# Patient Record
Sex: FEMALE | Race: WHITE | Employment: OTHER | URBAN - METROPOLITAN AREA
[De-identification: names, ages, dates, MRNs, and addresses within clinical notes are randomized per-mention and may not be internally consistent; named-entity substitution may affect disease eponyms.]

---

## 2018-07-16 ENCOUNTER — THERAPY VISIT (OUTPATIENT)
Dept: PHYSICAL THERAPY | Facility: CLINIC | Age: 83
End: 2018-07-16
Payer: COMMERCIAL

## 2018-07-16 DIAGNOSIS — M99.05 SOMATIC DYSFUNCTION OF PELVIC REGION: Primary | ICD-10-CM

## 2018-07-16 DIAGNOSIS — N39.46 MIXED INCONTINENCE: ICD-10-CM

## 2018-07-16 PROCEDURE — 97112 NEUROMUSCULAR REEDUCATION: CPT | Mod: GP | Performed by: PHYSICAL THERAPIST

## 2018-07-16 PROCEDURE — 97161 PT EVAL LOW COMPLEX 20 MIN: CPT | Mod: GP | Performed by: PHYSICAL THERAPIST

## 2018-07-16 PROCEDURE — 97530 THERAPEUTIC ACTIVITIES: CPT | Mod: GP | Performed by: PHYSICAL THERAPIST

## 2018-07-16 NOTE — MR AVS SNAPSHOT
After Visit Summary   7/16/2018    Sofía Zuniga    MRN: [id number]           Patient Information     Date Of Birth          1935        Visit Information        Provider Department      7/16/2018 12:00 PM Connie Estrada PT [facility] Physical Therapy        Today's Diagnoses     Somatic dysfunction of pelvic region    -  1    Mixed incontinence           Follow-ups after your visit        Your next 10 appointments already scheduled     Jul 23, 2018 11:20 AM CDT   JAYLA For Women Only with Connie Estrada PT   [facility] Physical Therapy ([facility]  )    [address] [place] [address]   [phone]            Aug 06, 2018  1:20 PM CDT   JAYLA For Women Only with Connie Estrada PT   [facility] Physical Therapy ([facility]  )    [address] [place] [address]   [phone]            Aug 13, 2018  1:20 PM CDT   JAYLA For Women Only with Connie Estrada PT   [facility] Physical Therapy ([facility]  )    [address] [place] [address]   [phone]            Aug 20, 2018 12:40 PM CDT   JAYLA For Women Only with Connie Estrada, PT   [facility] ([facility]  )    [address] [place] [address]   [phone]              Who to contact     If you have questions or need follow up information about today's clinic visit or your schedule please contact [place] [facility] PHYSICAL THERAPY directly at [phone].  Normal or non-critical lab and imaging results will be communicated to you by MyChart, letter or phone within 4 business days after the clinic has received the results. If you do not hear from us within 7 days, please contact the clinic through MyChart or phone. If you have a critical or abnormal lab result, we  will notify you by phone as soon as possible.  Submit refill requests through [employer] or call your pharmacy and they will forward the refill request to us. Please allow 3 business days for your refill to be completed.          Additional Information About Your Visit        Care EveryWhere ID     This is your Care EveryWhere ID. This could be used by other organizations to access your [facility] medical records  [id number]         Blood Pressure from Last 3 Encounters:   No data found for BP    Weight from Last 3 Encounters:   No data found for Wt              We Performed the Following     HC PT EVAL, LOW COMPLEXITY     JAYLA INITIAL EVAL REPORT     NEUROMUSCULAR RE-EDUCATION     THERAPEUTIC ACTIVITIES        Primary Care Provider Office Phone # Fax #    Marlin Liu [phone]       [address] [address]        Equal Access to Services     [facility] : Hadii aad ku hadasho Soomaali, waaxda luqadaha, qaybta kaalmada adeegyada, waxay fuentesin hayrey medrano . So [facility] [phone].    ATENCIÓN: Si habla español, tiene a pabon disposición servicios gratuitos de asistencia lingüística. Neena al [phone].    We comply with applicable federal civil rights laws and [place] laws. We do not discriminate on the basis of race, color, national origin, age, disability, sex, sexual orientation, or gender identity.            Thank you!     Thank you for choosing [facility] PHYSICAL THERAP[facility]  for your care. Our goal is always to provide you with excellent care. Hearing back from our patients is one way we can continue to improve our services. Please take a few minutes to complete the written survey that you may receive in the mail after your visit with us. Thank you!             Your Updated Medication List - Protect others around you: Learn how to safely use, store and throw away your medicines at  www.disposemymeds.org.      Notice  As of 7/16/2018 11:59 PM    You have not been prescribed any medications.

## 2018-07-16 NOTE — LETTER
[facility]  [address] [address]  [phone]    2018    Re: Sofía Zuniga   :   1935  MRN:  [id number]   REFERRING PHYSICIAN:   Marlin Liu    [facility]    Date of Initial Evaluation:  18  Visits:  Rxs Used: 1  Reason for Referral:     Somatic dysfunction of pelvic region  Mixed incontinence    EVALUATION SUMMARY    Physical Therapy Initial Examination/Evaluation 2018   Sofía Zuniga is a 82 year old female referred to physical therapy by Dr. Marlin Liu for treatment of Urinary Incontinence with Precautions/Restrictions/MD instruction: none noted   Therapist Assessment:   Clinical Impression: Pt  presents to [facility]  with primary complaint of stress incontinence and urgency at night.  Per clinical examination, pt with decreased tone in pelvic floor and compensating with surrounding muscles to complete pelvic floor contraction.   Pt will benefit from skilled physical therapy for strength and stabilization program. Pt will also benefit from education on optimal daily fluid intake and normal voiding patterns to reduce straining with urination.   Subjective:  Pt reports that with sneezing, coughing, and laughing she will have urinary leakage. Pt has been waking up at night and has difficulty getting to the bathroom in time. Pt unsure of how long incontinence has been going on. Pt reports there are times she does nt feel she empties bladder.   DOI/onset: MD pedraza 2018   Mechanism of injury: none    DOS NA   Related PMH: NA Previous treatment: none   Imaging: NA   Chief Complaint: Stress Incontinence and urinary urgency at night   Pain: rest 0  /10, activity 0 /10  Described as: NA Alleviated by: NA Exacerbated by: NA     Re: Sofía Zuniga   :   1935    Progression of symptoms since initial onset: Worsening  Time of day when  pain is worse: Activity or night time   Sleeping: pt wakes 2-3x/night to urinate   Social history: Granddaugther is an MD; lives alone; had 2 children; daughter passed 3 years ago; son is here in the [place]  Occupation: Retired professor Job duties: daily housework   Current HEP/exercise regimen: Minimal; goes to pool 2x/wk   Patient's goals are Decrease incontinence be able to sleep better at night   Other pertinent PMH: hx of CA (somethin on nose), depression, htn, incontinence, OA, overweight, sleep disorder, thyroid problems, headaches, difficulty using hands General health as reported by patient: Poor    Return to MD: prn    Urination:  Do you leak on the way to the bathroom or with a strong urge to void? Yes    Do you leak with cough,sneeze, jumping, running?Yes   Any other activities that cause leaking? No   Do you have triggers that make you feel you can't wait to go to the bathroom? No     Type of pad and number used per day? Has begun to wear pad; 1 thin pad  When you leak what is the amount? Small    How long can you delay the need to urinate? Pt tries to go right away.   How many times do you get up to urinate at night? 2-3x/ night to urinate    Can you stop the flow of urine when on the toilet? Unsure  Is the volume of urine passed usually: Variable. (8sec rule=  250ml with average bladder storing  400-600ml)    Do you strain to pass urine? Yes  Do you have a slow or hesitant urinary stream? Unsure  Do you have difficulty initiating the urine stream? Yes  Is urination painful?  Only with infection     How many bladder infections have you had in last 12 months? Unsure    Fluid intake(one glass is 8oz or one cup) 2 bottles/day (cultural issue), 1x/day cup of coffee caffinated glasses/day  Very rare alcohol glasses/day.    Bowel habits:  Frequency of bowel movements? 1-2 times a day  Consistancy of stool? soft formed  Do you ignore the urge to defecate? No  Do you strain to pass stool? No      Re:  Sofía Zuniga   :   1935      Pelvic Pain:  Do you have any pelvic pain with intercourse, exams, use of tampons? No  Is initial penetration during intercourse painful? NA  Is deeper penetration painful? NA    Given birth? Yes Any complications? No  # of vaginal delieveries? 2   # of C-sections? 0  # of episiotomies? 0.  Are you sexually active?No  Have you ever been worried for your physical safety? No  Any abdominal or pelvic surgeries? None noted  Are you having any regular exercise? Goes to the pool 2x/wk   Have you practiced the PF(kegel) exercises for 4 or more weeks? No    MUSCLE PERFORMANCE    Baseline PF tone:hypo  PF Tone with cough: hypo  Valsalva: not tested  PF Response quality: sluggish moderate  PF Power: Center: 2   Endurance: Maximum contraction in seconds: 4 seconds   # of endurance contractions before fatigue: NT  Quick contraction repetitions prior to fatigue: 8-10  Specificity/accessory muscles: Abdominals, glutes, adductors      2018 Left Right   Hip Flexion 4/5 4/5   Hip Abduction 3/5 3/5   Hip Extension  4-/5 4/5     PALPATION: hypotonic pelvic floor     Therapeutic Activity (25 min): Today's session consisted of education regarding pelvic floor muscle anatomy, normal bladder function, urge suppression techniques and/or relaxation techniques as indicated, and instruction in how to complete a bladder diary for assessment next visit.  Pt was instructed in the pathoanatomy of the pelvic floor utilizing pelvic model.  We discussed what pelvic floor physical therapy is, components of exam, and typical patient progression.  Pt was told that she was in control of exam progression, and if at any time was uncomfortable and wished to discontinue we could. Discussed proper voiding techniques and importance of limiting strain to pelvic floor. Discussed manual pressure vs sit to stand transfers to assist with emptying bladder.           Re: Sofía Zuniga   :   1935      [facility] (20  min)  Biofeedback unit used to provide visualization of PF activation in multiple body positions including supine, sidelying, and sitting.  Education provided on building strength for power and endurance in pt's PF due to pt's low resting tone and symptoms. Pt working on 2s contractions, 4s rest,  10 reps 2-3 x/day, with goal of 60 contractions/day.  Initiated education on proper body mechanics for lifting and the importance of core strength.  Pt provided with HEP.    Assessment/Plan:    Patient is a 82 year old female with pelvic complaints.    Patient has the following significant findings with corresponding treatment plan.                Diagnosis 1:  Urinary Incontinence  Decreased strength - therapeutic exercise, therapeutic activities and home program  Impaired muscle performance - biofeedback, electric stimulation, neuro re-education and home program  Decreased function - therapeutic activities and home program    Therapy Evaluation Codes:   1) History comprised of:   Personal factors that impact the plan of care:      Age, Living environment, Past/current experiences and Time since onset of  symptoms.    Comorbidity factors that impact the plan of care are:      Cancer, Depression, High blood pressure, Migraines/headaches, Osteoarthritis,  Overweight, Sleep disorder/apnea and Weakness.     Medications impacting care: Anti-depressant, High blood pressure and Thyroid.  2) Examination of Body Systems comprised of:   Body structures and functions that impact the plan of care:      Pelvis.   Activity limitations that impact the plan of care are:      Stress incontinence, Urgency, Urge incontinence and Urinary incontinence.  3) Clinical presentation characteristics are:   Stable/Uncomplicated.  4) Decision-Making    Low complexity using standardized patient assessment instrument and/or  measureable assessment of functional outcome.  Cumulative Therapy Evaluation is: Low complexity.    Previous and current  functional limitations:  (See Goal Flow Sheet for this information)    Short term and Long term goals: (See Goal Flow Sheet for this information)     Communication ability:  Patient appears to be able to clearly communicate and understand verbal and written communication and follow directions correctly.  Treatment Explanation - The following has been discussed with the patient:   RX ordered/plan of care  Anticipated outcomes  Possible risks and side effects  This patient would benefit from PT intervention to resume normal activities.   Rehab potential is good.  Re: Sofía Zuniga   :   1935      Frequency:  1 X week, once daily  Duration:  for 8 weeks  Discharge Plan:  Achieve all LTG.  Independent in home treatment program.  Reach maximal therapeutic benefit.    Please refer to the daily flowsheet for treatment today, total treatment time and time spent performing 1:1 timed codes.     Thank you for your referral.    INQUIRIES  Therapist: Connie Estrada DPT   [facility] PHYSICAL THERAPY  [address] [place] [address]  Phone: [phone]  Fax: [phone]

## 2018-07-19 PROBLEM — M99.05 SOMATIC DYSFUNCTION OF PELVIC REGION: Status: ACTIVE | Noted: 2018-07-19

## 2018-07-19 PROBLEM — N39.46 MIXED INCONTINENCE: Status: ACTIVE | Noted: 2018-07-19

## 2018-07-23 ENCOUNTER — THERAPY VISIT (OUTPATIENT)
Dept: PHYSICAL THERAPY | Facility: CLINIC | Age: 83
End: 2018-07-23
Payer: COMMERCIAL

## 2018-07-23 DIAGNOSIS — N39.46 MIXED INCONTINENCE: ICD-10-CM

## 2018-07-23 DIAGNOSIS — M99.05 SOMATIC DYSFUNCTION OF PELVIC REGION: Primary | ICD-10-CM

## 2018-07-23 PROCEDURE — 97110 THERAPEUTIC EXERCISES: CPT | Mod: GP | Performed by: PHYSICAL THERAPIST

## 2018-07-23 PROCEDURE — 97112 NEUROMUSCULAR REEDUCATION: CPT | Mod: GP | Performed by: PHYSICAL THERAPIST

## 2018-07-23 NOTE — MR AVS SNAPSHOT
After Visit Summary   7/23/2018    Sofía Zuniga    MRN: [id number]           Patient Information     Date Of Birth          1935        Visit Information        Provider Department      7/23/2018 11:20 AM Connie Estrada PT [facility] Physical Therapy        Today's Diagnoses     Somatic dysfunction of pelvic region    -  1    Mixed incontinence           Follow-ups after your visit        Your next 10 appointments already scheduled     Aug 06, 2018  1:20 PM CDT   JAYLA For Women Only with Connie Estrada PT   [facility] Physical Therapy ([facility]  )    [address] [place] [address]   [phone]            Aug 13, 2018  1:20 PM CDT   JAYLA For Women Only with Connie Estrada PT   [facility] Physical Therapy ([facility]  )    [address] [place] [address]   [phone]            Aug 20, 2018 12:40 PM CDT   JAYLA For Women Only with Connie Estrada PT   [facility] Physical Therapy ([facility]  )    [address] [place] [address]   [phone]            Aug 27, 2018 11:20 AM CDT   JAYLA For Women Only with Connie Estrada, PT   [facility] Physical Therapy ([facility]  )    [address] [place] [address]   [phone]            Sep 05, 2018 11:30 AM CDT   JAYLA For Women Only with Connie Estrada PT   [facility] Physical Therapy ([facility]  )    [address] [place] [address]   [phone]              Who to contact     If you have questions or need follow up information about today's clinic visit or your schedule please contact [place] [facility] PHYSICAL THERAPY directly at [phone].  Normal or non-critical lab and imaging results will be communicated to you by  MyChart, letter or phone within 4 business days after the clinic has received the results. If you do not hear from us within 7 days, please contact the clinic through MyChart or phone. If you have a critical or abnormal lab result, we will notify you by phone as soon as possible.  Submit refill requests through [employer] or call your pharmacy and they will forward the refill request to us. Please allow 3 business days for your refill to be completed.          Additional Information About Your Visit        Care EveryWhere ID     This is your Care EveryWhere ID. This could be used by other organizations to access your [place] medical records  [id number]         Blood Pressure from Last 3 Encounters:   No data found for BP    Weight from Last 3 Encounters:   No data found for Wt              We Performed the Following     NEUROMUSCULAR RE-EDUCATION     THERAPEUTIC EXERCISES        Primary Care Provider Office Phone # Fax #    Marlin Liu [phone]       [place] [address] [address] [address]        Equal Access to Services     [facility]: Hadii aad ku hadasho Soomaali, waaxda luqadaha, qaybta kaalmada adeegyada, waxay idiin hayaan manuel medrano . So [facility] [phone].    ATENCIÓN: Si olivierla español, tiene a pabon disposición servicios gratuitos de asistencia lingüística. [facility] [phone].    We comply with applicable federal civil rights laws and [place] laws. We do not discriminate on the basis of race, color, national origin, age, disability, sex, sexual orientation, or gender identity.            Thank you!     Thank you for choosing [facility] PHYSICAL THERAP[facility]  for your care. Our goal is always to provide you with excellent care. Hearing back from our patients is one way we can continue to improve our services. Please take a few minutes to complete the written survey that you may receive in the mail after your visit  with us. Thank you!             Your Updated Medication List - Protect others around you: Learn how to safely use, store and throw away your medicines at www.disposemymeds.org.      Notice  As of 7/23/2018 12:11 PM    You have not been prescribed any medications.

## 2019-07-17 PROBLEM — N39.46 MIXED INCONTINENCE: Status: RESOLVED | Noted: 2018-07-19 | Resolved: 2018-07-23

## 2019-07-17 NOTE — PROGRESS NOTES
Discharge Note    Progress reporting period is from initial evaluation date  Jul 16 to Jul 23, 2018.      Sofía failed to follow up and current status is unknown.  Please see information below for last relevant information on current status.  Patient seen for 2 visits.    SUBJECTIVE  Subjective changes noted by patient:  Pt worked on home exercises and tried different positions, noticing other muscles were trying to compensate. She was able to stop urine flow ~40% of the time.   .  Current pain level is  .     Previous pain level was  0/10(Does have increased pain in knee).   Changes in function:  Yes (See Goal flowsheet attached for changes in current functional level)  Adverse reaction to treatment or activity: None    OBJECTIVE  Changes noted in objective findings: Biofeedback for visualization; progressed PF contractions to sitting     ASSESSMENT/PLAN  Diagnosis: Urinary incontinence   Updated problem list and treatment plan:   Decreased function - HEP  Decreased strength - HEP  Impaired muscle performance - HEP  STG/LTGs have been met or progress has been made towards goals:  Yes, please see goal flowsheet for most current information  Assessment of Progress: current status is unknown.    Last current status: Pt is progressing as expected   Self Management Plans:  HEP  I have re-evaluated this patient and find that the nature, scope, duration and intensity of the therapy is appropriate for the medical condition of the patient.  Sofía continues to require the following intervention to meet STG and LTG's:  HEP.    Recommendations:  Discharge with current home program.  Patient to follow up with MD as needed.    Please refer to the daily flowsheet for treatment today, total treatment time and time spent performing 1:1 timed codes.

## 2025-03-06 NOTE — PROGRESS NOTES
[facility]  System  Physical Exam  General   ROS        Physical Therapy Initial Examination/Evaluation July 16, 2018   Sofía Zuniga is a 82 year old female referred to physical therapy by Dr. Marlin Liu for treatment of Urinary Incontinence with Precautions/Restrictions/MD instruction: none noted   Therapist Assessment:   Clinical Impression: Pt  presents to [facility]  with primary complaint of stress incontinence and urgency at night.  Per clinical examination, pt with decreased tone in pelvic floor and compensating with surrounding muscles to complete pelvic floor contraction.   Pt will benefit from skilled physical therapy for strength and stabilization program. Pt will also benefit from education on optimal daily fluid intake and normal voiding patterns to reduce straining with urination.      Subjective:  Pt reports that with sneezing, coughing, and laughing she will have urinary leakage. Pt has been waking up at night and has difficulty getting to the bathroom in time. Pt unsure of how long incontinence has been going on. Pt reports there are times she does nt feel she empties bladder.   DOI/onset: MD order 6/18/2018   Mechanism of injury: none    DOS NA   Related PMH: NA Previous treatment: none   Imaging: NA   Chief Complaint: Stress Incontinence and urinary urgency at night   Pain: rest 0  /10, activity 0 /10  Described as: NA Alleviated by: NA Exacerbated by: NA Progression of symptoms since initial onset: Worsening  Time of day when pain is worse: Activity or night time   Sleeping: pt wakes 2-3x/night to urinate   Social history: Granddaugther is an MD; lives alone; had 2 children; daughter passed 3 years ago; son is here in the [place]  Occupation: Retired professor Job duties: daily housework   Current HEP/exercise regimen: Minimal; goes to pool 2x/wk   Patient's goals are Decrease incontinence be able to sleep better at night   Other pertinent PMH: hx of CA (somethin on nose),  Rounding and report with offgoing nurses. Care disussed from their shift. FLACC 0/10, no needs assessed at this time. Safety precautions in place, bed locked in lowest position, side rails x 2 in place and call light within reach.   depression, htn, incontinence, OA, overweight, sleep disorder, thyroid problems, headaches, difficulty using hands General health as reported by patient: Poor    Return to MD: prn      Urination:  Do you leak on the way to the bathroom or with a strong urge to void? Yes    Do you leak with cough,sneeze, jumping, running?Yes   Any other activities that cause leaking? No   Do you have triggers that make you feel you can't wait to go to the bathroom? No     Type of pad and number used per day? Has begun to wear pad; 1 thin pad  When you leak what is the amount? Small    How long can you delay the need to urinate? Pt tries to go right away.   How many times do you get up to urinate at night? 2-3x/ night to urinate    Can you stop the flow of urine when on the toilet? Unsure  Is the volume of urine passed usually: Variable. (8sec rule=  250ml with average bladder storing  400-600ml)    Do you strain to pass urine? Yes  Do you have a slow or hesitant urinary stream? Unsure  Do you have difficulty initiating the urine stream? Yes  Is urination painful?  Only with infection     How many bladder infections have you had in last 12 months? Unsure    Fluid intake(one glass is 8oz or one cup) 2 bottles/day (cultural issue), 1x/day cup of coffee caffinated glasses/day  Very rare alcohol glasses/day.    Bowel habits:  Frequency of bowel movements? 1-2 times a day  Consistancy of stool? soft formed  Do you ignore the urge to defecate? No  Do you strain to pass stool? No    Pelvic Pain:  Do you have any pelvic pain with intercourse, exams, use of tampons? No  Is initial penetration during intercourse painful? NA  Is deeper penetration painful? NA        Given birth? Yes Any complications? No  # of vaginal delieveries? 2   # of C-sections? 0  # of episiotomies? 0.  Are you sexually active?No  Have you ever been worried for your physical safety? No  Any abdominal or pelvic surgeries? None noted  Are you having any regular exercise?  Goes to the pool 2x/wk   Have you practiced the PF(kegel) exercises for 4 or more weeks? No         MUSCLE PERFORMANCE    Baseline PF tone:hypo  PF Tone with cough: hypo  Valsalva: not tested  PF Response quality: sluggish moderate  PF Power: Center: 2   Endurance: Maximum contraction in seconds: 4 seconds   # of endurance contractions before fatigue: NT  Quick contraction repetitions prior to fatigue: 8-10  Specificity/accessory muscles: Abdominals, glutes, adductors      7/16/2018 Left Right   Hip Flexion 4/5 4/5   Hip Abduction 3/5 3/5   Hip Extension  4-/5 4/5         PALPATION: hypotonic pelvic floor     Therapeutic Activity (25 min): Today's session consisted of education regarding pelvic floor muscle anatomy, normal bladder function, urge suppression techniques and/or relaxation techniques as indicated, and instruction in how to complete a bladder diary for assessment next visit.  Pt was instructed in the pathoanatomy of the pelvic floor utilizing pelvic model.  We discussed what pelvic floor physical therapy is, components of exam, and typical patient progression.  Pt was told that she was in control of exam progression, and if at any time was uncomfortable and wished to discontinue we could. Discussed proper voiding techniques and importance of limiting strain to pelvic floor. Discussed manual pressure vs sit to stand transfers to assist with emptying bladder.     NMR (20 min)  Biofeedback unit used to provide visualization of PF activation in multiple body positions including supine, sidelying, and sitting.  Education provided on building strength for power and endurance in pt's PF due to pt's low resting tone and symptoms. Pt working on 2s contractions, 4s rest,  10 reps 2-3 x/day, with goal of 60 contractions/day.  Initiated education on proper body mechanics for lifting and the importance of core strength.  Pt provided with HEP.      Assessment/Plan:    Patient is a 82 year old female with pelvic  complaints.    Patient has the following significant findings with corresponding treatment plan.                Diagnosis 1:  Urinary Incontinence  Decreased strength - therapeutic exercise, therapeutic activities and home program  Impaired muscle performance - biofeedback, electric stimulation, neuro re-education and home program  Decreased function - therapeutic activities and home program    Therapy Evaluation Codes:   1) History comprised of:   Personal factors that impact the plan of care:      Age, Living environment, Past/current experiences and Time since onset of symptoms.    Comorbidity factors that impact the plan of care are:      Cancer, Depression, High blood pressure, Migraines/headaches, Osteoarthritis, Overweight, Sleep disorder/apnea and Weakness.     Medications impacting care: Anti-depressant, High blood pressure and Thyroid.  2) Examination of Body Systems comprised of:   Body structures and functions that impact the plan of care:      Pelvis.   Activity limitations that impact the plan of care are:      Stress incontinence, Urgency, Urge incontinence and Urinary incontinence.  3) Clinical presentation characteristics are:   Stable/Uncomplicated.  4) Decision-Making    Low complexity using standardized patient assessment instrument and/or measureable assessment of functional outcome.  Cumulative Therapy Evaluation is: Low complexity.    Previous and current functional limitations:  (See Goal Flow Sheet for this information)    Short term and Long term goals: (See Goal Flow Sheet for this information)     Communication ability:  Patient appears to be able to clearly communicate and understand verbal and written communication and follow directions correctly.  Treatment Explanation - The following has been discussed with the patient:   RX ordered/plan of care  Anticipated outcomes  Possible risks and side effects  This patient would benefit from PT intervention to resume normal activities.   Rehab  potential is good.    Frequency:  1 X week, once daily  Duration:  for 8 weeks  Discharge Plan:  Achieve all LTG.  Independent in home treatment program.  Reach maximal therapeutic benefit.    Please refer to the daily flowsheet for treatment today, total treatment time and time spent performing 1:1 timed codes.